# Patient Record
Sex: FEMALE | URBAN - METROPOLITAN AREA
[De-identification: names, ages, dates, MRNs, and addresses within clinical notes are randomized per-mention and may not be internally consistent; named-entity substitution may affect disease eponyms.]

---

## 2018-09-20 ENCOUNTER — OFFICE VISIT (OUTPATIENT)
Dept: URBAN - METROPOLITAN AREA CLINIC 33 | Facility: CLINIC | Age: 33
End: 2018-09-20
Payer: COMMERCIAL

## 2018-09-20 DIAGNOSIS — H53.149 ASTHENOPIA: Primary | ICD-10-CM

## 2018-09-20 PROCEDURE — 99203 OFFICE O/P NEW LOW 30 MIN: CPT | Performed by: OPTOMETRIST

## 2018-09-20 ASSESSMENT — KERATOMETRY
OS: 41.50
OD: 41.75

## 2018-09-20 ASSESSMENT — INTRAOCULAR PRESSURE
OD: 12
OS: 12

## 2018-09-20 NOTE — IMPRESSION/PLAN
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 Plan: Asthenopia OU. Patient mild hyperope w/ headaches and eyestrain worse towards the end of the day and when reading. Damp Ret shows mild hyperope.  Recommend OTC +0.75 for reading/computer